# Patient Record
Sex: FEMALE | Race: BLACK OR AFRICAN AMERICAN | NOT HISPANIC OR LATINO | Employment: FULL TIME | URBAN - METROPOLITAN AREA
[De-identification: names, ages, dates, MRNs, and addresses within clinical notes are randomized per-mention and may not be internally consistent; named-entity substitution may affect disease eponyms.]

---

## 2023-11-15 ENCOUNTER — HOSPITAL ENCOUNTER (EMERGENCY)
Facility: CLINIC | Age: 23
Discharge: HOME | End: 2023-11-15
Attending: EMERGENCY MEDICINE | Admitting: EMERGENCY MEDICINE
Payer: COMMERCIAL

## 2023-11-15 ENCOUNTER — APPOINTMENT (OUTPATIENT)
Dept: RADIOLOGY | Facility: CLINIC | Age: 23
End: 2023-11-15
Attending: EMERGENCY MEDICINE
Payer: COMMERCIAL

## 2023-11-15 ENCOUNTER — NURSE TRIAGE (OUTPATIENT)
Dept: NURSING | Facility: CLINIC | Age: 23
End: 2023-11-15

## 2023-11-15 VITALS
DIASTOLIC BLOOD PRESSURE: 83 MMHG | OXYGEN SATURATION: 100 % | SYSTOLIC BLOOD PRESSURE: 132 MMHG | TEMPERATURE: 98.6 F | WEIGHT: 117 LBS | HEART RATE: 83 BPM | RESPIRATION RATE: 14 BRPM

## 2023-11-15 DIAGNOSIS — R06.02 SHORTNESS OF BREATH: ICD-10-CM

## 2023-11-15 DIAGNOSIS — F17.290 HOOKAH PIPE SMOKER: ICD-10-CM

## 2023-11-15 DIAGNOSIS — R51.9 HEADACHE: ICD-10-CM

## 2023-11-15 LAB
ANION GAP SERPL CALCULATED.3IONS-SCNC: 17 MMOL/L (ref 7–15)
ATRIAL RATE - MUSE: 83 BPM
BASOPHILS # BLD AUTO: 0 10E3/UL (ref 0–0.2)
BASOPHILS NFR BLD AUTO: 0 %
BUN SERPL-MCNC: 13.8 MG/DL (ref 6–20)
CALCIUM SERPL-MCNC: 9.5 MG/DL (ref 8.6–10)
CHLORIDE SERPL-SCNC: 105 MMOL/L (ref 98–107)
COHGB MFR BLD: 8.8 % (ref 0–1.5)
CREAT SERPL-MCNC: 0.71 MG/DL (ref 0.51–0.95)
D DIMER PPP FEU-MCNC: 0.37 UG/ML FEU (ref 0–0.5)
DEPRECATED HCO3 PLAS-SCNC: 19 MMOL/L (ref 22–29)
DIASTOLIC BLOOD PRESSURE - MUSE: 83 MMHG
EGFRCR SERPLBLD CKD-EPI 2021: >90 ML/MIN/1.73M2
EOSINOPHIL # BLD AUTO: 0 10E3/UL (ref 0–0.7)
EOSINOPHIL NFR BLD AUTO: 0 %
ERYTHROCYTE [DISTWIDTH] IN BLOOD BY AUTOMATED COUNT: 13 % (ref 10–15)
FLUAV RNA SPEC QL NAA+PROBE: NEGATIVE
FLUBV RNA RESP QL NAA+PROBE: NEGATIVE
GLUCOSE SERPL-MCNC: 101 MG/DL (ref 70–99)
HCG SERPL QL: NEGATIVE
HCT VFR BLD AUTO: 41.7 % (ref 35–47)
HGB BLD-MCNC: 13.7 G/DL (ref 11.7–15.7)
HOLD SPECIMEN: NORMAL
IMM GRANULOCYTES # BLD: 0 10E3/UL
IMM GRANULOCYTES NFR BLD: 0 %
INTERPRETATION ECG - MUSE: NORMAL
LYMPHOCYTES # BLD AUTO: 1.3 10E3/UL (ref 0.8–5.3)
LYMPHOCYTES NFR BLD AUTO: 33 %
MCH RBC QN AUTO: 29.3 PG (ref 26.5–33)
MCHC RBC AUTO-ENTMCNC: 32.9 G/DL (ref 31.5–36.5)
MCV RBC AUTO: 89 FL (ref 78–100)
MONOCYTES # BLD AUTO: 0.3 10E3/UL (ref 0–1.3)
MONOCYTES NFR BLD AUTO: 8 %
NEUTROPHILS # BLD AUTO: 2.3 10E3/UL (ref 1.6–8.3)
NEUTROPHILS NFR BLD AUTO: 59 %
NRBC # BLD AUTO: 0 10E3/UL
NRBC BLD AUTO-RTO: 0 /100
P AXIS - MUSE: 8 DEGREES
PLATELET # BLD AUTO: 254 10E3/UL (ref 150–450)
POTASSIUM SERPL-SCNC: 3.9 MMOL/L (ref 3.4–5.3)
PR INTERVAL - MUSE: 108 MS
QRS DURATION - MUSE: 76 MS
QT - MUSE: 360 MS
QTC - MUSE: 423 MS
R AXIS - MUSE: 80 DEGREES
RBC # BLD AUTO: 4.67 10E6/UL (ref 3.8–5.2)
RSV RNA SPEC NAA+PROBE: NEGATIVE
SARS-COV-2 RNA RESP QL NAA+PROBE: NEGATIVE
SODIUM SERPL-SCNC: 141 MMOL/L (ref 135–145)
SYSTOLIC BLOOD PRESSURE - MUSE: 132 MMHG
T AXIS - MUSE: -5 DEGREES
TROPONIN T SERPL HS-MCNC: <6 NG/L
VENTRICULAR RATE- MUSE: 83 BPM
WBC # BLD AUTO: 3.9 10E3/UL (ref 4–11)

## 2023-11-15 PROCEDURE — 84703 CHORIONIC GONADOTROPIN ASSAY: CPT | Performed by: EMERGENCY MEDICINE

## 2023-11-15 PROCEDURE — 85025 COMPLETE CBC W/AUTO DIFF WBC: CPT | Performed by: EMERGENCY MEDICINE

## 2023-11-15 PROCEDURE — 80048 BASIC METABOLIC PNL TOTAL CA: CPT | Performed by: EMERGENCY MEDICINE

## 2023-11-15 PROCEDURE — 82375 ASSAY CARBOXYHB QUANT: CPT | Performed by: EMERGENCY MEDICINE

## 2023-11-15 PROCEDURE — 84484 ASSAY OF TROPONIN QUANT: CPT

## 2023-11-15 PROCEDURE — 258N000003 HC RX IP 258 OP 636

## 2023-11-15 PROCEDURE — 96375 TX/PRO/DX INJ NEW DRUG ADDON: CPT

## 2023-11-15 PROCEDURE — 71046 X-RAY EXAM CHEST 2 VIEWS: CPT

## 2023-11-15 PROCEDURE — 87637 SARSCOV2&INF A&B&RSV AMP PRB: CPT | Performed by: EMERGENCY MEDICINE

## 2023-11-15 PROCEDURE — 250N000011 HC RX IP 250 OP 636: Mod: JZ

## 2023-11-15 PROCEDURE — 96374 THER/PROPH/DIAG INJ IV PUSH: CPT

## 2023-11-15 PROCEDURE — 36415 COLL VENOUS BLD VENIPUNCTURE: CPT | Performed by: EMERGENCY MEDICINE

## 2023-11-15 PROCEDURE — 85379 FIBRIN DEGRADATION QUANT: CPT

## 2023-11-15 PROCEDURE — 96361 HYDRATE IV INFUSION ADD-ON: CPT

## 2023-11-15 PROCEDURE — 36415 COLL VENOUS BLD VENIPUNCTURE: CPT

## 2023-11-15 PROCEDURE — 93005 ELECTROCARDIOGRAM TRACING: CPT | Performed by: EMERGENCY MEDICINE

## 2023-11-15 PROCEDURE — 99285 EMERGENCY DEPT VISIT HI MDM: CPT | Mod: 25

## 2023-11-15 RX ORDER — DIPHENHYDRAMINE HYDROCHLORIDE 50 MG/ML
25 INJECTION INTRAMUSCULAR; INTRAVENOUS ONCE
Status: COMPLETED | OUTPATIENT
Start: 2023-11-15 | End: 2023-11-15

## 2023-11-15 RX ORDER — DEXAMETHASONE SODIUM PHOSPHATE 10 MG/ML
10 INJECTION, SOLUTION INTRAMUSCULAR; INTRAVENOUS ONCE
Status: COMPLETED | OUTPATIENT
Start: 2023-11-15 | End: 2023-11-15

## 2023-11-15 RX ORDER — KETOROLAC TROMETHAMINE 15 MG/ML
15 INJECTION, SOLUTION INTRAMUSCULAR; INTRAVENOUS ONCE
Status: COMPLETED | OUTPATIENT
Start: 2023-11-15 | End: 2023-11-15

## 2023-11-15 RX ORDER — METOCLOPRAMIDE HYDROCHLORIDE 5 MG/ML
10 INJECTION INTRAMUSCULAR; INTRAVENOUS ONCE
Status: COMPLETED | OUTPATIENT
Start: 2023-11-15 | End: 2023-11-15

## 2023-11-15 RX ADMIN — DIPHENHYDRAMINE HYDROCHLORIDE 25 MG: 50 INJECTION, SOLUTION INTRAMUSCULAR; INTRAVENOUS at 16:41

## 2023-11-15 RX ADMIN — DEXAMETHASONE SODIUM PHOSPHATE 10 MG: 10 INJECTION, SOLUTION INTRAMUSCULAR; INTRAVENOUS at 16:32

## 2023-11-15 RX ADMIN — METOCLOPRAMIDE HYDROCHLORIDE 10 MG: 5 INJECTION INTRAMUSCULAR; INTRAVENOUS at 16:45

## 2023-11-15 RX ADMIN — SODIUM CHLORIDE 1000 ML: 9 INJECTION, SOLUTION INTRAVENOUS at 16:38

## 2023-11-15 RX ADMIN — KETOROLAC TROMETHAMINE 15 MG: 15 INJECTION, SOLUTION INTRAMUSCULAR; INTRAVENOUS at 16:41

## 2023-11-15 ASSESSMENT — ENCOUNTER SYMPTOMS
DIARRHEA: 0
CHILLS: 0
VOMITING: 1
NAUSEA: 1
NUMBNESS: 0
CONSTIPATION: 0
SORE THROAT: 0
COUGH: 0
FEVER: 0
DIZZINESS: 1
LIGHT-HEADEDNESS: 1
SEIZURES: 0
NECK PAIN: 0
ABDOMINAL PAIN: 0
BACK PAIN: 0
WEAKNESS: 0
SHORTNESS OF BREATH: 1
HEADACHES: 1

## 2023-11-15 NOTE — ED NOTES
"IYasmin have reviewed the documentation, personally taken the patient's history, performed an exam and agree with the physical finds, diagnosis and management plan.    HPI:  Ginette Bo is a 22 year old female who presents to this ED for evaluation of headache.      Patient reports for the past 3 days, has been experiencing worsening headache, lightheadedness, nausea, vomiting, chest pain and shortness of breath.  Denies previous headache history.  Has not been taking medication for the pain.  No head injury or loss of consciousness.  Denies known sick contacts.  Is a daily hookah user, last use last evening, and was \"reading online\" that her symptoms could possibly due to carbon monoxide poisoning and is concerned about this.  Denies sore throat, cough, neck pain, fever, chills, abdominal pain, diarrhea, constipation, urinary symptoms or other symptoms at this time.  Denies marijuana use or other drug use.     Physical Exam: Well-appearing female, no acute distress.  Alert and oriented x3, cranial nerves III through XII intact, 5 out of 5 upper lower extremity strength with normal gait.  Regular rate rhythm, lungs are clear no respiratory distress.    MDM: Viral syndrome, pregnancy, tension headache or migraine headache.  Overall my concern for carbon monoxide poisoning is low    ED Course/workup: Carbon monoxide level elevated in the level consistent with a smoker.  Remainder of laboratory work-up is fairly unremarkable other than minimal anion gap acidosis.  Patient felt improved with symptom control as well as some oxygen recommended by poison control and will be discharged home.      ED Course as of 11/15/23 1741   Wed Nov 15, 2023   1602 C/o 3d ha, sob, cp, n/v.  Using a hooka daily for years.  Last used last night, she's concerned about CO poisoning.    1603 Poison control recs couple hrs of O2 and discharge home no recheck. Rec trop and EKG.    1603 Carbon Monoxide(!): 8.8   1605 Troponin " T, High Sensitivity: <6  Doesn't warrant repeat       EKG:  EKG individually read and interpreted by myself:  Twelve-lead EKG shows sinus rhythm with sinus arrhythmia.  Nonspecific T wave inversion inferior lead III.  No previous EKG with which to compare.    Final Diagnosis:     ICD-10-CM    1. Headache  R51.9       2. Shortness of breath  R06.02       3. Hookah pipe smoker  F17.290           I personally saw the patient and performed a substantive portion of the visit including all aspects of the medical decision making.     MD Derrick Suero Zabrina N, MD  11/15/23 174

## 2023-11-15 NOTE — ED PROVIDER NOTES
EMERGENCY DEPARTMENT ENCOUNTER      NAME: Ginette Bo  AGE: 22 year old female  YOB: 2000  MRN: [id number]  EVALUATION DATE & TIME: No admission date for patient encounter.    PCP: No Ref-Primary, Physician    ED PROVIDER: Kaitlin Ayala PA-C    Chief Complaint   Patient presents with    Chest Pain    Shortness of Breath    Headache     FINAL IMPRESSION:  1. Headache    2. Shortness of breath    3. Hookah pipe smoker      MEDICAL DECISION MAKING:    Pertinent Labs & Imaging studies reviewed. (See chart for details)    Patient evaluated in waiting room due to critical capacity    Ginette Bo is a 22 year old female who presents for evaluation of headache, dizziness, vomiting, chest pain and shortness of breath. Patient reports she has been experiencing these symptoms for the past 3 days. No head injury or loss of consciousness.  Denies known sick contacts.Is a daily hookah user, last use was last evening. Patient came in today due to concern for carbon monoxide poisoning. Denies sore throat, cough, neck pain, fever, chills, abdominal pain, diarrhea, constipation, urinary symptoms or other symptoms at this time.  Denies tobacco or other drug use.     On my initial evaluation, vital signs normal. On physical exam she is mildly uncomfortable appearing, but is not ill or toxic.  Heart sounds are normal, lungs are clear without wheezing or crackles.  No abdominal tenderness on palpation, no distention, rebound, guarding or masses.  No skin rashes or lesions to scalp or face.  Full neurologic exam without focal deficit, cranial nerves III through XII intact.  No ataxia, no pronator drift, normal finger-nose-finger.  Normal gait.  No CT LS spine tenderness and full range of motion of neck..    Differential diagnosis includes COVID, influenza, RSV, other viral URI, pneumonia, pleural effusion, pneumothorax, ACS, PE, carbon monoxide poisoning, pericarditis, myocarditis, endocarditis,  tension headache, migraine headache, cluster headache, subdural hematoma, epidural hematoma, subarachnoid hemorrhage, other intracranial pathology, shingles, meningitis. Emergency department workup included basic labs in addition to COVID, influenza swabs, carbon monoxide levels, troponin, EKG, D-dimer, chest x-ray. Patient was given ongoing cocktail including IV fluids, IV Toradol, Decadron, Benadryl, Reglan with some improvement in symptoms.    Unclear cause for patient's symptoms, however due to carbon monoxide levels at 8.8, I did reach out and spoke with poison control. Based on patient's history and CM levels here, they did not feel this was CM poisoning. Suspect elevated levels likely due to chronic Hookah use. They are recommending supplemental oxygen for the next few hours and to obtain a troponin, EKG and reassess at following that. EKG with T wave inversion in lead III and V3, initial troponin<6 and with symptoms more than 3 days, no indication for repeat. Very low suspicion for ACS.  D-dimer normal, low suspicion for PE., chest x-ray clear without pneumonia, pleural effusion or pneumothorax.  Patient negative for COVID, influenza or RSV.    On recheck after interventions today, patient reports her chest pain and shortness of breath have resolved as well as her headache, very low suspicion that this is pericarditis, myocarditis, endocarditis or further cardiac cause for her symptoms that would require further ER intervention or work-up here today.  It is possible this could be a migraine headache especially in the setting of movement after migraine cocktail.  No head injury and patient is neurologically intact, very low suspicion for subdural hematoma, epidural hematoma, subarachnoid hemorrhage or intracranial pathology that would require CT imaging today.  No skin rashes or lesions to suggest shingles.  No fevers, no neck stiffness or pain, low suspicion for meningitis.  Plan for symptomatic management.   Discussed strict cessation of hookah use.  Patient is otherwise clinically well-appearing and vitally stable, very reassuring work-up here today.  Low suspicion for any emergent process that would require further ER intervention or hospital admission.  Patient was monitored after the appropriate amount of time and discharged in stable condition.  All questions addressed prior to discharge and provided strict return precautions.    Patient has had serial examinations and notes significant improvement.     Patient was discharged in stable condition with treatment plan as below. Instructed to follow up with primary care provider in 3 days and return to the emergency department with any new or worsening of symptoms. Patient expressed understanding, feels comfortable, and is in agreement with this plan. All questions addressed prior to discharge.    Medical Decision Making    History:  Supplemental history from: Documented in chart, if applicable  External Record(s) reviewed: Documented in chart, if applicable. and Outpatient Record: nurse triage call    Work Up:  Chart documentation includes differential considered and any EKGs or imaging independently interpreted by provider, where specified.  In additional to work up documented, I considered the following work up: Documented in chart, if applicable.    External consultation:  Discussion of management with another provider: Documented in chart, if applicable and Poison Control    Complicating factors:  Care impacted by chronic illness: N/A  Care affected by social determinants of health: N/A    Disposition considerations: Discharge. No recommendations on prescription strength medication(s). N/A.    ED COURSE:  2:40 PM  I reviewed the patient's chart. I met with the patient to gather history and to perform my initial exam.   2:54 PM called poison control, recommending O2, trop, EKG, repeat in 3 hours and can discharge home if feeling better.   4:03 PM  I staffed this  patient case with Dr. Meza who agrees with plan at this time and will see the patient for their history, exam, and complete evaluation.  5:21 PM  I rechecked the patient and updated her on results.feeling improved and requesting discharge. We discussed plan for discharge including treatment plan, follow-up and return precautions to emergency department.  Patient voiced understanding and in agreement with this plan.    At the conclusion of the encounter I discussed the results of all of the tests and the disposition. The questions were answered. The patient or family acknowledged understanding and was agreeable with the care plan.     Voice recognition software was used in the creation of this note. Any grammatical or nonsensical errors are due to inherent errors with the software and are not the intention of the writer.     MEDICATIONS GIVEN IN THE EMERGENCY:  Medications   sodium chloride 0.9% BOLUS 1,000 mL (0 mLs Intravenous Stopped 11/15/23 1731)   ketorolac (TORADOL) injection 15 mg (15 mg Intravenous $Given 11/15/23 1641)   diphenhydrAMINE (BENADRYL) injection 25 mg (25 mg Intravenous $Given 11/15/23 1641)   dexAMETHasone PF (DECADRON) injection 10 mg (10 mg Intravenous $Given 11/15/23 1632)   metoclopramide (REGLAN) injection 10 mg (10 mg Intravenous $Given 11/15/23 1645)       NEW PRESCRIPTIONS STARTED AT TODAY'S ER VISIT  There are no discharge medications for this patient.    =================================================================    HPI:    Patient information was obtained from: patient     Use of Interpretor: N/A     Ginette NEWTON Anupam is a 22 year old female with a pertinent history of daily Hooka user who presents to this ED for evaluation of headache.     Patient reports for the past 3 days, has been experiencing worsening headache, lightheadedness, nausea, vomiting, chest pain and shortness of breath.  Denies previous headache history.  Has not been taking medication for the pain.  No  "head injury or loss of consciousness.  Denies known sick contacts.  Is a daily hookah user, last use last evening, and was \"reading online\" that her symptoms could possibly due to carbon monoxide poisoning and is concerned about this.  Denies sore throat, cough, neck pain, fever, chills, abdominal pain, diarrhea, constipation, urinary symptoms or other symptoms at this time.  Denies marijuana use or other drug use.    REVIEW OF SYSTEMS:  Review of Systems   Constitutional:  Negative for chills and fever.   HENT:  Negative for sore throat.    Respiratory:  Positive for shortness of breath. Negative for cough.    Cardiovascular:  Positive for chest pain.   Gastrointestinal:  Positive for nausea and vomiting. Negative for abdominal pain, constipation and diarrhea.   Musculoskeletal:  Negative for back pain and neck pain.   Neurological:  Positive for dizziness, light-headedness and headaches. Negative for seizures, syncope, weakness and numbness.   All other systems reviewed and are negative.     PAST MEDICAL HISTORY:  No past medical history on file.    PAST SURGICAL HISTORY:  No past surgical history on file.    CURRENT MEDICATIONS:    No current facility-administered medications for this encounter.  No current outpatient medications on file.    ALLERGIES:  Allergies   Allergen Reactions    Blood-Group Specific Substance Other (See Comments)     Patient has nonspecific antibody. Blood products may be delayed. Draw patient 24 hours prior to transfusion. For [employer] testing, draw one red top and two purple top tubes for all Type and Screen orders.    Oxycodone Itching       FAMILY HISTORY:  No family history on file.    SOCIAL HISTORY:   Social History     Socioeconomic History    Marital status: Single       VITALS:  Patient Vitals for the past 24 hrs:   BP Temp Temp src Pulse Resp SpO2 Weight   11/15/23 1143 132/83 98.6  F (37  C) Temporal 83 14 100 % 53.1 kg (117 lb)       PHYSICAL EXAM    Constitutional: Well " developed, Well nourished, NAD, mildly uncomfortable appearing, but is not ill or toxic   HENT: Normocephalic, Atraumatic, Bilateral external ears normal, Oropharynx normal, mucous membranes moist, Nose normal.   Neck: Normal range of motion, No tenderness, Supple, No stridor.  Eyes: PERRL, EOMI, Conjunctiva normal, No discharge.   Respiratory: Normal breath sounds, No respiratory distress, No wheezing, Speaks full sentences easily. No cough.  Cardiovascular: Normal heart rate, Regular rhythm, No murmurs, No rubs, No gallops. Chest wall nontender.  GI: Soft, No tenderness, No masses, No flank tenderness. No rebound or guarding.  Musculoskeletal: 2+ DP pulses. No edema. No cyanosis, No clubbing. Good range of motion in all major joints. No tenderness to palpation or major deformities noted. No tenderness of the CTLS spine.   Integument: Warm, Dry, No erythema, No rash. No petechiae.  Neurologic: Alert & oriented x 3, Normal motor function, Normal sensory function, No focal deficits noted. Normal gait. cranial nerves III through XII intact.  No ataxia, no pronator drift, normal finger-nose-finger.   Psychiatric: Affect normal, Judgment normal, Mood normal. Cooperative.    LAB:  All pertinent labs reviewed and interpreted.  Labs Ordered and Resulted from Time of ED Arrival to Time of ED Departure   CARBON MONOXIDE - Abnormal       Result Value    Carbon Monoxide 8.8 (*)    BASIC METABOLIC PANEL - Abnormal    Sodium 141      Potassium 3.9      Chloride 105      Carbon Dioxide (CO2) 19 (*)     Anion Gap 17 (*)     Urea Nitrogen 13.8      Creatinine 0.71      GFR Estimate >90      Calcium 9.5      Glucose 101 (*)    CBC WITH PLATELETS AND DIFFERENTIAL - Abnormal    WBC Count 3.9 (*)     RBC Count 4.67      Hemoglobin 13.7      Hematocrit 41.7      MCV 89      MCH 29.3      MCHC 32.9      RDW 13.0      Platelet Count 254      % Neutrophils 59      % Lymphocytes 33      % Monocytes 8      % Eosinophils 0      % Basophils 0       % Immature Granulocytes 0      NRBCs per 100 WBC 0      Absolute Neutrophils 2.3      Absolute Lymphocytes 1.3      Absolute Monocytes 0.3      Absolute Eosinophils 0.0      Absolute Basophils 0.0      Absolute Immature Granulocytes 0.0      Absolute NRBCs 0.0     HCG QUALITATIVE PREGNANCY - Normal    hCG Serum Qualitative Negative     INFLUENZA A/B, RSV, & SARS-COV2 PCR - Normal    Influenza A PCR Negative      Influenza B PCR Negative      RSV PCR Negative      SARS CoV2 PCR Negative     TROPONIN T, HIGH SENSITIVITY - Normal    Troponin T, High Sensitivity <6     D DIMER QUANTITATIVE - Normal    D-Dimer Quantitative 0.37         RADIOLOGY:  Reviewed all pertinent imaging. Please see official radiology report.  Chest XR,  PA & LAT   Final Result   IMPRESSION: Negative chest.          EKG:    Performed at: 1149  Rate: 83 bpm  Impression: Sinus rhythm, T wave inversion in lead V3 and III    I have reviewed and interpreted the EKG(s) documented above along with Dr. Meza.    PROCEDURES:   None     Diagnosis:  1. Headache    2. Shortness of breath    3. Hookah pipe smoker        Kaitlin Ayala PA-C  Emergency Medicine  [facility]  11/15/2023       Kaitlin Ayala PA-C  11/15/23 [id number]

## 2023-11-15 NOTE — Clinical Note
Ginette Ortegaress was seen and treated in our emergency department on 11/15/2023.  She may return to work on 11/17/2023.       If you have any questions or concerns, please don't hesitate to call.      Kaitlin Ayala PA-C

## 2023-11-15 NOTE — TELEPHONE ENCOUNTER
Headache, chest pain and shortness of breath x 3 days.  Told her physical therapist about it today.    Has felt hot and has been having hot flashes.  Has not checked temperature.    PT checked vitals.  O2Sat was 89%  HR 70.    Per the protocol, I recommended that Ginette be seen now in an ER/  Caller stated understanding and agreement.  Will go to [facility] ER.      Reason for Disposition   MODERATE difficulty breathing (e.g., speaks in phrases, SOB even at rest, pulse 100-120) of new-onset or worse than normal    Additional Information   Negative: SEVERE difficulty breathing (e.g., struggling for each breath, speaks in single words, pulse > 120)   Negative: Breathing stopped and hasn't returned   Negative: Choking on something   Negative: Bluish (or gray) lips or face   Negative: Difficult to awaken or acting confused (e.g., disoriented, slurred speech)   Negative: Passed out (i.e., fainted, collapsed and was not responding)   Negative: Wheezing started suddenly after medicine, an allergic food, or bee sting   Negative: Stridor (harsh sound while breathing in)   Negative: Slow, shallow and weak breathing   Negative: Sounds like a life-threatening emergency to the triager    Protocols used: Breathing Difficulty-A-[place]  Lindsay LAWSON RN [facility]

## 2023-11-15 NOTE — DISCHARGE INSTRUCTIONS
You were seen in the ER for headache, vomiting, shortness breath and chest pain.  As we discussed, your work-up has been very reassuring today and we do not think that this is a heart attack or a blood clot in your lungs.  You were negative for covid or influenza. We reached out to poison control, who did not think that this was carbon monoxide poisoning.  They recommended giving you oxygen while you are here as well as discontinuing to use the hookah as this can make your symptoms worse.  Please follow-up with your primary care provider for ER follow-up.  Take Tylenol or ibuprofen for your headache.  Stay well-hydrated drink plenty of fluids.  Return to the ER for any new or worsening symptoms.

## 2023-11-15 NOTE — ED TRIAGE NOTES
Pt is worried she has carbon monoxide poisoning from smoking Hooka daily. Pt c/o shortness of breath, chest pain, lightheaded, also has headache.

## 2025-03-10 ENCOUNTER — HOSPITAL ENCOUNTER (EMERGENCY)
Facility: HOSPITAL | Age: 25
Discharge: HOME | End: 2025-03-11
Attending: EMERGENCY MEDICINE | Admitting: EMERGENCY MEDICINE

## 2025-03-10 DIAGNOSIS — T07.XXXA MULTIPLE CONTUSIONS: ICD-10-CM

## 2025-03-10 DIAGNOSIS — S06.0XAA CONCUSSION WITH UNKNOWN LOSS OF CONSCIOUSNESS STATUS, INITIAL ENCOUNTER: ICD-10-CM

## 2025-03-10 PROCEDURE — 99285 EMERGENCY DEPT VISIT HI MDM: CPT | Mod: 25

## 2025-03-11 ENCOUNTER — APPOINTMENT (OUTPATIENT)
Dept: CT IMAGING | Facility: HOSPITAL | Age: 25
End: 2025-03-11
Attending: EMERGENCY MEDICINE

## 2025-03-11 VITALS
BODY MASS INDEX: 19.48 KG/M2 | SYSTOLIC BLOOD PRESSURE: 125 MMHG | WEIGHT: 114.1 LBS | RESPIRATION RATE: 16 BRPM | OXYGEN SATURATION: 98 % | DIASTOLIC BLOOD PRESSURE: 80 MMHG | HEIGHT: 64 IN | HEART RATE: 75 BPM | TEMPERATURE: 98.4 F

## 2025-03-11 LAB
ANION GAP SERPL CALCULATED.3IONS-SCNC: 9 MMOL/L (ref 7–15)
BUN SERPL-MCNC: 14.7 MG/DL (ref 6–20)
CALCIUM SERPL-MCNC: 9.7 MG/DL (ref 8.8–10.4)
CHLORIDE SERPL-SCNC: 105 MMOL/L (ref 98–107)
CREAT SERPL-MCNC: 0.75 MG/DL (ref 0.51–0.95)
EGFRCR SERPLBLD CKD-EPI 2021: >90 ML/MIN/1.73M2
GLUCOSE SERPL-MCNC: 93 MG/DL (ref 70–99)
HCG SERPL QL: NEGATIVE
HCO3 SERPL-SCNC: 24 MMOL/L (ref 22–29)
POTASSIUM SERPL-SCNC: 4.3 MMOL/L (ref 3.4–5.3)
SODIUM SERPL-SCNC: 138 MMOL/L (ref 135–145)

## 2025-03-11 PROCEDURE — 70450 CT HEAD/BRAIN W/O DYE: CPT

## 2025-03-11 PROCEDURE — 72131 CT LUMBAR SPINE W/O DYE: CPT

## 2025-03-11 PROCEDURE — 84703 CHORIONIC GONADOTROPIN ASSAY: CPT | Performed by: STUDENT IN AN ORGANIZED HEALTH CARE EDUCATION/TRAINING PROGRAM

## 2025-03-11 PROCEDURE — 36415 COLL VENOUS BLD VENIPUNCTURE: CPT | Performed by: EMERGENCY MEDICINE

## 2025-03-11 PROCEDURE — 70490 CT SOFT TISSUE NECK W/O DYE: CPT

## 2025-03-11 PROCEDURE — 96375 TX/PRO/DX INJ NEW DRUG ADDON: CPT

## 2025-03-11 PROCEDURE — 80048 BASIC METABOLIC PNL TOTAL CA: CPT | Performed by: EMERGENCY MEDICINE

## 2025-03-11 PROCEDURE — 250N000011 HC RX IP 250 OP 636: Performed by: EMERGENCY MEDICINE

## 2025-03-11 PROCEDURE — 70486 CT MAXILLOFACIAL W/O DYE: CPT

## 2025-03-11 PROCEDURE — 96374 THER/PROPH/DIAG INJ IV PUSH: CPT

## 2025-03-11 RX ORDER — IOPAMIDOL 755 MG/ML
90 INJECTION, SOLUTION INTRAVASCULAR ONCE
Status: DISCONTINUED | OUTPATIENT
Start: 2025-03-11 | End: 2025-03-11

## 2025-03-11 RX ORDER — METOCLOPRAMIDE HYDROCHLORIDE 5 MG/ML
10 INJECTION INTRAMUSCULAR; INTRAVENOUS ONCE
Status: COMPLETED | OUTPATIENT
Start: 2025-03-11 | End: 2025-03-11

## 2025-03-11 RX ORDER — KETOROLAC TROMETHAMINE 15 MG/ML
15 INJECTION, SOLUTION INTRAMUSCULAR; INTRAVENOUS ONCE
Status: COMPLETED | OUTPATIENT
Start: 2025-03-11 | End: 2025-03-11

## 2025-03-11 RX ORDER — DIPHENHYDRAMINE HYDROCHLORIDE 50 MG/ML
50 INJECTION, SOLUTION INTRAMUSCULAR; INTRAVENOUS ONCE
Status: COMPLETED | OUTPATIENT
Start: 2025-03-11 | End: 2025-03-11

## 2025-03-11 RX ADMIN — METOCLOPRAMIDE 10 MG: 5 INJECTION, SOLUTION INTRAMUSCULAR; INTRAVENOUS at 00:34

## 2025-03-11 RX ADMIN — KETOROLAC TROMETHAMINE 15 MG: 15 INJECTION, SOLUTION INTRAMUSCULAR; INTRAVENOUS at 00:33

## 2025-03-11 RX ADMIN — DIPHENHYDRAMINE HYDROCHLORIDE 50 MG: 50 INJECTION INTRAMUSCULAR; INTRAVENOUS at 00:33

## 2025-03-11 ASSESSMENT — ACTIVITIES OF DAILY LIVING (ADL)
ADLS_ACUITY_SCORE: 41
ADLS_ACUITY_SCORE: 41
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
## 2025-08-19 ENCOUNTER — HOSPITAL ENCOUNTER (EMERGENCY)
Facility: HOSPITAL | Age: 25
Discharge: HOME | End: 2025-08-19
Attending: EMERGENCY MEDICINE | Admitting: EMERGENCY MEDICINE

## 2025-08-19 VITALS
WEIGHT: 121 LBS | HEIGHT: 64 IN | RESPIRATION RATE: 16 BRPM | BODY MASS INDEX: 20.66 KG/M2 | TEMPERATURE: 98.2 F | SYSTOLIC BLOOD PRESSURE: 137 MMHG | DIASTOLIC BLOOD PRESSURE: 92 MMHG | OXYGEN SATURATION: 100 % | HEART RATE: 80 BPM

## 2025-08-19 DIAGNOSIS — F41.0 ANXIETY ATTACK: ICD-10-CM

## 2025-08-19 DIAGNOSIS — J34.89 NASAL DISCOMFORT: Primary | ICD-10-CM

## 2025-08-19 PROCEDURE — 99283 EMERGENCY DEPT VISIT LOW MDM: CPT | Performed by: EMERGENCY MEDICINE

## 2025-08-19 ASSESSMENT — COLUMBIA-SUICIDE SEVERITY RATING SCALE - C-SSRS
2. HAVE YOU ACTUALLY HAD ANY THOUGHTS OF KILLING YOURSELF IN THE PAST MONTH?: NO
6. HAVE YOU EVER DONE ANYTHING, STARTED TO DO ANYTHING, OR PREPARED TO DO ANYTHING TO END YOUR LIFE?: YES
1. IN THE PAST MONTH, HAVE YOU WISHED YOU WERE DEAD OR WISHED YOU COULD GO TO SLEEP AND NOT WAKE UP?: NO

## 2025-08-19 ASSESSMENT — ACTIVITIES OF DAILY LIVING (ADL)
ADLS_ACUITY_SCORE: 41
ADLS_ACUITY_SCORE: 41

## 2025-08-20 ENCOUNTER — PATIENT OUTREACH (OUTPATIENT)
Dept: CARE COORDINATION | Facility: CLINIC | Age: 25
End: 2025-08-20